# Patient Record
Sex: MALE
[De-identification: names, ages, dates, MRNs, and addresses within clinical notes are randomized per-mention and may not be internally consistent; named-entity substitution may affect disease eponyms.]

---

## 2017-02-17 ENCOUNTER — PRIOR ORIGINAL RECORDS (OUTPATIENT)
Dept: OTHER | Age: 42
End: 2017-02-17

## 2017-02-17 ENCOUNTER — MYAURORA ACCOUNT LINK (OUTPATIENT)
Dept: OTHER | Age: 42
End: 2017-02-17

## 2019-03-01 VITALS
HEART RATE: 62 BPM | DIASTOLIC BLOOD PRESSURE: 60 MMHG | BODY MASS INDEX: 26.96 KG/M2 | WEIGHT: 182 LBS | SYSTOLIC BLOOD PRESSURE: 118 MMHG | HEIGHT: 69 IN

## 2022-10-07 ENCOUNTER — TELEPHONE (OUTPATIENT)
Dept: SCHEDULING | Age: 47
End: 2022-10-07

## 2024-07-13 ENCOUNTER — HOSPITAL ENCOUNTER (EMERGENCY)
Facility: HOSPITAL | Age: 49
Discharge: HOME | End: 2024-07-13
Attending: EMERGENCY MEDICINE
Payer: COMMERCIAL

## 2024-07-13 ENCOUNTER — APPOINTMENT (OUTPATIENT)
Dept: GENERAL RADIOLOGY | Facility: HOSPITAL | Age: 49
End: 2024-07-13
Attending: EMERGENCY MEDICINE
Payer: COMMERCIAL

## 2024-07-13 VITALS
HEART RATE: 40 BPM | SYSTOLIC BLOOD PRESSURE: 143 MMHG | RESPIRATION RATE: 16 BRPM | TEMPERATURE: 98 F | OXYGEN SATURATION: 100 % | DIASTOLIC BLOOD PRESSURE: 69 MMHG

## 2024-07-13 DIAGNOSIS — R00.1 ASYMPTOMATIC BRADYCARDIA: ICD-10-CM

## 2024-07-13 DIAGNOSIS — R00.1 SINUS BRADYCARDIA: Primary | ICD-10-CM

## 2024-07-13 LAB
ALBUMIN SERPL-MCNC: 4 G/DL (ref 3.4–5)
ALBUMIN/GLOB SERPL: 1.3 {RATIO} (ref 1–2)
ALP LIVER SERPL-CCNC: 106 U/L
ALT SERPL-CCNC: 38 U/L
ANION GAP SERPL CALC-SCNC: 5 MMOL/L (ref 0–18)
AST SERPL-CCNC: 9 U/L (ref 15–37)
ATRIAL RATE: 47 BPM
BASOPHILS # BLD AUTO: 0.03 X10(3) UL (ref 0–0.2)
BASOPHILS NFR BLD AUTO: 0.5 %
BILIRUB SERPL-MCNC: 0.9 MG/DL (ref 0.1–2)
BUN BLD-MCNC: 11 MG/DL (ref 9–23)
CALCIUM BLD-MCNC: 10.3 MG/DL (ref 8.5–10.1)
CHLORIDE SERPL-SCNC: 105 MMOL/L (ref 98–112)
CO2 SERPL-SCNC: 28 MMOL/L (ref 21–32)
CREAT BLD-MCNC: 0.84 MG/DL
EGFRCR SERPLBLD CKD-EPI 2021: 108 ML/MIN/1.73M2 (ref 60–?)
EOSINOPHIL # BLD AUTO: 0.28 X10(3) UL (ref 0–0.7)
EOSINOPHIL NFR BLD AUTO: 4.4 %
ERYTHROCYTE [DISTWIDTH] IN BLOOD BY AUTOMATED COUNT: 13.5 %
GLOBULIN PLAS-MCNC: 3.1 G/DL (ref 2.8–4.4)
GLUCOSE BLD-MCNC: 127 MG/DL (ref 70–99)
HCT VFR BLD AUTO: 43.6 %
HGB BLD-MCNC: 14.8 G/DL
IMM GRANULOCYTES # BLD AUTO: 0.01 X10(3) UL (ref 0–1)
IMM GRANULOCYTES NFR BLD: 0.2 %
LYMPHOCYTES # BLD AUTO: 2.7 X10(3) UL (ref 1–4)
LYMPHOCYTES NFR BLD AUTO: 42.7 %
MCH RBC QN AUTO: 31 PG (ref 26–34)
MCHC RBC AUTO-ENTMCNC: 33.9 G/DL (ref 31–37)
MCV RBC AUTO: 91.4 FL
MONOCYTES # BLD AUTO: 0.34 X10(3) UL (ref 0.1–1)
MONOCYTES NFR BLD AUTO: 5.4 %
NEUTROPHILS # BLD AUTO: 2.96 X10 (3) UL (ref 1.5–7.7)
NEUTROPHILS # BLD AUTO: 2.96 X10(3) UL (ref 1.5–7.7)
NEUTROPHILS NFR BLD AUTO: 46.8 %
OSMOLALITY SERPL CALC.SUM OF ELEC: 287 MOSM/KG (ref 275–295)
P AXIS: 63 DEGREES
P-R INTERVAL: 192 MS
PLATELET # BLD AUTO: 216 10(3)UL (ref 150–450)
POTASSIUM SERPL-SCNC: 3.6 MMOL/L (ref 3.5–5.1)
PROT SERPL-MCNC: 7.1 G/DL (ref 6.4–8.2)
Q-T INTERVAL: 412 MS
QRS DURATION: 92 MS
QTC CALCULATION (BEZET): 364 MS
R AXIS: 43 DEGREES
RBC # BLD AUTO: 4.77 X10(6)UL
SODIUM SERPL-SCNC: 138 MMOL/L (ref 136–145)
T AXIS: 50 DEGREES
T4 FREE SERPL-MCNC: 0.9 NG/DL (ref 0.8–1.7)
TROPONIN I SERPL HS-MCNC: 4 NG/L
TSI SER-ACNC: 1.04 MIU/ML (ref 0.36–3.74)
VENTRICULAR RATE: 47 BPM
WBC # BLD AUTO: 6.3 X10(3) UL (ref 4–11)

## 2024-07-13 PROCEDURE — 93005 ELECTROCARDIOGRAM TRACING: CPT

## 2024-07-13 PROCEDURE — 85025 COMPLETE CBC W/AUTO DIFF WBC: CPT | Performed by: EMERGENCY MEDICINE

## 2024-07-13 PROCEDURE — 93010 ELECTROCARDIOGRAM REPORT: CPT

## 2024-07-13 PROCEDURE — 84443 ASSAY THYROID STIM HORMONE: CPT | Performed by: EMERGENCY MEDICINE

## 2024-07-13 PROCEDURE — 99285 EMERGENCY DEPT VISIT HI MDM: CPT

## 2024-07-13 PROCEDURE — 84484 ASSAY OF TROPONIN QUANT: CPT | Performed by: EMERGENCY MEDICINE

## 2024-07-13 PROCEDURE — 99284 EMERGENCY DEPT VISIT MOD MDM: CPT

## 2024-07-13 PROCEDURE — 80053 COMPREHEN METABOLIC PANEL: CPT | Performed by: EMERGENCY MEDICINE

## 2024-07-13 PROCEDURE — 71045 X-RAY EXAM CHEST 1 VIEW: CPT | Performed by: EMERGENCY MEDICINE

## 2024-07-13 PROCEDURE — 36415 COLL VENOUS BLD VENIPUNCTURE: CPT

## 2024-07-13 PROCEDURE — 84439 ASSAY OF FREE THYROXINE: CPT | Performed by: EMERGENCY MEDICINE

## 2024-07-13 NOTE — ED PROVIDER NOTES
Patient Seen in: [facility] Emergency Department      History     Chief Complaint   Patient presents with    Dizziness    Arrythmia/Palpitations     Stated Complaint: dizzy and valentin    Subjective:   HPI    Patient is a 48-year-old male presents emergency room with a history of being seen by his primary care doctor for well visit this morning was found to have a low heart rate and was sent to the ER for evaluation.  The patient states he feels fine at this time he has been running on a regular basis 4 days a week and denies history of any chest pain or shortness of breath at any time and especially when exercising.  The patient states he has been told he had a low heart rate in the past.  Patient denies history of any chest discomfort.  The patient denies shortness of breath.  The patient denies dizziness or lightheadedness.  The patient denies history of any other somatic complaints or discomfort at this time.  The patient denies any new medication he is taking at this time.  The patient states that he was not told by his primary care doctor to come to the ER to be evaluated he would not be here in the emergency room at this time.    Objective:   History reviewed. No pertinent past medical history.           Past Surgical History:   Procedure Laterality Date    Cholecystectomy                  Social History     Socioeconomic History    Marital status:    Tobacco Use    Smoking status: Never    Smokeless tobacco: Never   Vaping Use    Vaping status: Never Used   Substance and Sexual Activity    Alcohol use: Never    Drug use: Never              Review of Systems    Positive for stated Chief Complaint: Dizziness and Arrythmia/Palpitations    Other systems are as noted in HPI.  Constitutional and vital signs reviewed.      All other systems reviewed and negative except as noted above.    Physical Exam     ED Triage Vitals   BP 07/13/24 0930 138/78   Pulse 07/13/24 0927 58   Resp 07/13/24 0927 14   Temp  07/13/24 0930 98 °F (36.7 °C)   Temp src 07/13/24 0930 Temporal   SpO2 07/13/24 0927 99 %   O2 Device 07/13/24 0927 None (Room air)       Current Vitals:   Vital Signs  BP: 143/69  Pulse: (!) 40  Resp: 16  Temp: 98 °F (36.7 °C)  Temp src: Temporal  MAP (mmHg): 92    Oxygen Therapy  SpO2: 100 %  O2 Device: None (Room air)            Physical Exam    GENERAL: Well-developed, well-nourished male sitting up breathing easily in no apparent distress.  Patient is nontoxic in appearance.  HEENT: Head is normocephalic, atraumatic. Pupils are 4 mm equally round and reactive to light. Oropharynx is clear. Mucous membranes are moist.  LUNGS: Clear to auscultation bilaterally with no wheeze. There is good equal air entry bilaterally.  HEART: Regular rhythm with a bradycardic rate. Normal S1, S2 no S3, or S4. No murmur.  ABDOMEN: There is no focal tenderness to palpation appreciated anywhere throughout the abdomen. There is no guarding, no rebound, no mass, and no organomegaly appreciated. There is normoactive bowel sounds. EXTREMITIES: There is no cyanosis, clubbing, or edema appreciated. Pulses are 2+ and equal in all 4 extremities.  NEURO: Patient is awake, alert and oriented to time place and person. Motor strength is 5 over 5 in all 4 extremities. There are no gross motor or sensory deficits appreciated.  Patient is up and ambulatory in the ER with a steady gait and no ataxia.          ED Course     Labs Reviewed   COMP METABOLIC PANEL (14) - Abnormal; Notable for the following components:       Result Value    Glucose 127 (*)     Calcium, Total 10.3 (*)     AST 9 (*)     All other components within normal limits   TROPONIN I HIGH SENSITIVITY - Normal   FREE T4 (FREE THYROXINE) - Normal   ASSAY, THYROID STIM HORMONE - Normal   CBC WITH DIFFERENTIAL WITH PLATELET    Narrative:     The following orders were created for panel order CBC With Differential With Platelet.  Procedure                               Abnormality          Status                     ---------                               -----------         ------                     CBC W/ DIFFERENTIAL[[id number]]                              Final result                 Please view results for these tests on the individual orders.   RAINBOW DRAW LAVENDER   RAINBOW DRAW LIGHT GREEN   RAINBOW DRAW BLUE   RAINBOW DRAW GOLD   CBC W/ DIFFERENTIAL     EKG    Rate, intervals and axes as noted on EKG Report.  Rate: 47  Rhythm: Sinus Rhythm  Reading: No acute ischemic change noted         I personally reviewed the patient's chest x-ray images and my individual interpretation shows no evidence of any acute infiltrate or pneumothorax.  I also reviewed official read report which showed results as noted below.        XR CHEST AP PORTABLE  (CPT=71045)    Result Date: 7/13/2024  CONCLUSION:  There is no evidence of active cardiopulmonary disease on this single portable chest radiograph.  LOCATION:  [facility]      Dictated by (CST): David Gordon MD on 7/13/2024 at 10:10 AM     Finalized by (CST): David Gordon MD on 7/13/2024 at 10:11 AM               MDM          10:49 patient sitting back and breathing easily in no apparent distress time.  Patient with no complaints of any discomfort no complaints of dizziness or lightheadedness.  Patient's heart rate has been anywhere from the mid 30s to the mid 50s at this time.  The patient states he runs approximately 4 times a week.  Will continue to observe at this time and follow-up with cardiology.  11:06 patient sitting back and breathing easily in no apparent distress this time.  Patient with no complaints of any chest pain no complaints of any shortness of breath no complaints of any dizziness he is up and ambulatory in the ER with a steady gait and no ataxia.  The patient states he feels fine at this time.  Patient's case discussed with [facility] cardiology who are willing to follow-up with the patient.  Patient discharged home at this time.      Patient an  IV line established blood were drawn including CBC, chemistries, BUN and creatinine, and blood sugar all of which are unremarkable.  Liver function test and troponin found to be negative.  Thyroid function tests are found to be negative.  Patient was placed on continuous pulse ox and cardiac monitor remained in normal sinus rhythm but was bradycardic here in the ER.  Patient with no complaints of any chest pain.  Patient observed for some time in the ER he had no complaints whatsoever and states he has been running on a regular basis at home.  Patient eager to go home at this time.  Patient's case discussed with [facility] cardiology were available to follow-up with the patient in the office and instructions to have the patient return to the ER immediately if symptoms worsen or if any other problems arise.  Patient discharged home at this time.                           Medical Decision Making      Disposition and Plan     Clinical Impression:  1. Sinus bradycardia    2. Asymptomatic bradycardia         Disposition:  Discharge  7/13/2024 11:08 am    Follow-up:  [place] [address] [facility] [address] [phone]  Follow up in 1 week(s)  Office will call you for follow up appt          Medications Prescribed:  There are no discharge medications for this patient.